# Patient Record
Sex: MALE | Race: WHITE | Employment: STUDENT | URBAN - METROPOLITAN AREA
[De-identification: names, ages, dates, MRNs, and addresses within clinical notes are randomized per-mention and may not be internally consistent; named-entity substitution may affect disease eponyms.]

---
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
## 2020-01-07 ENCOUNTER — HOSPITAL ENCOUNTER (OUTPATIENT)
Dept: GENERAL RADIOLOGY | Age: 20
Discharge: HOME | End: 2020-01-07
Attending: FAMILY MEDICINE
Payer: COMMERCIAL

## 2020-01-07 ENCOUNTER — OFFICE VISIT (OUTPATIENT)
Dept: FAMILY MEDICINE CLINIC | Facility: CLINIC | Age: 20
End: 2020-01-07
Payer: COMMERCIAL

## 2020-01-07 VITALS
HEIGHT: 67.5 IN | HEART RATE: 111 BPM | SYSTOLIC BLOOD PRESSURE: 134 MMHG | DIASTOLIC BLOOD PRESSURE: 83 MMHG | WEIGHT: 172.81 LBS | BODY MASS INDEX: 26.81 KG/M2 | TEMPERATURE: 98 F

## 2020-01-07 DIAGNOSIS — S60.212A CONTUSION OF LEFT WRIST, INITIAL ENCOUNTER: ICD-10-CM

## 2020-01-07 DIAGNOSIS — M25.532 ACUTE PAIN OF LEFT WRIST: Primary | ICD-10-CM

## 2020-01-07 DIAGNOSIS — M25.532 ACUTE PAIN OF LEFT WRIST: ICD-10-CM

## 2020-01-07 PROCEDURE — 99213 OFFICE O/P EST LOW 20 MIN: CPT | Performed by: FAMILY MEDICINE

## 2020-01-07 PROCEDURE — L3908 WHO COCK-UP NONMOLDE PRE OTS: HCPCS | Performed by: FAMILY MEDICINE

## 2020-01-07 PROCEDURE — 73110 X-RAY EXAM OF WRIST: CPT | Performed by: FAMILY MEDICINE

## 2020-01-07 PROCEDURE — 99212 OFFICE O/P EST SF 10 MIN: CPT | Performed by: FAMILY MEDICINE

## 2020-01-07 RX ORDER — NAPROXEN 500 MG/1
500 TABLET ORAL 2 TIMES DAILY WITH MEALS
Qty: 42 TABLET | Refills: 0 | Status: SHIPPED | OUTPATIENT
Start: 2020-01-07 | End: 2020-01-22

## 2020-01-22 RX ORDER — NAPROXEN 500 MG/1
TABLET ORAL
Qty: 42 TABLET | Refills: 0 | Status: SHIPPED | OUTPATIENT
Start: 2020-01-22 | End: 2020-02-16

## 2020-01-23 NOTE — TELEPHONE ENCOUNTER
Refill passed per [facility] protocol.   Refill Protocol Appointment Criteria  · Appointment scheduled in the past 6 months or in the next 3 months  Recent Outpatient Visits            2 weeks ago Acute pain of left wrist    [facility], [address]

## 2020-01-27 ENCOUNTER — APPOINTMENT (OUTPATIENT)
Dept: LAB | Age: 20
End: 2020-01-27
Attending: FAMILY MEDICINE
Payer: COMMERCIAL

## 2020-01-27 ENCOUNTER — HOSPITAL ENCOUNTER (OUTPATIENT)
Dept: GENERAL RADIOLOGY | Age: 20
Discharge: HOME | End: 2020-01-27
Attending: FAMILY MEDICINE
Payer: COMMERCIAL

## 2020-01-27 ENCOUNTER — OFFICE VISIT (OUTPATIENT)
Dept: FAMILY MEDICINE CLINIC | Facility: CLINIC | Age: 20
End: 2020-01-27
Payer: COMMERCIAL

## 2020-01-27 VITALS
TEMPERATURE: 97 F | BODY MASS INDEX: 26.84 KG/M2 | SYSTOLIC BLOOD PRESSURE: 131 MMHG | HEIGHT: 67.5 IN | DIASTOLIC BLOOD PRESSURE: 88 MMHG | HEART RATE: 108 BPM | WEIGHT: 173 LBS

## 2020-01-27 DIAGNOSIS — M77.8 TENDINITIS OF LEFT WRIST: ICD-10-CM

## 2020-01-27 DIAGNOSIS — M25.532 LEFT WRIST PAIN: ICD-10-CM

## 2020-01-27 DIAGNOSIS — R00.0 TACHYCARDIA: ICD-10-CM

## 2020-01-27 DIAGNOSIS — M25.532 LEFT WRIST PAIN: Primary | ICD-10-CM

## 2020-01-27 LAB — TSI SER-ACNC: 1.7 MIU/ML (ref 0.36–3.74)

## 2020-01-27 PROCEDURE — 84443 ASSAY THYROID STIM HORMONE: CPT

## 2020-01-27 PROCEDURE — 99212 OFFICE O/P EST SF 10 MIN: CPT | Performed by: FAMILY MEDICINE

## 2020-01-27 PROCEDURE — 93010 ELECTROCARDIOGRAM REPORT: CPT | Performed by: FAMILY MEDICINE

## 2020-01-27 PROCEDURE — 93005 ELECTROCARDIOGRAM TRACING: CPT

## 2020-01-27 PROCEDURE — 99214 OFFICE O/P EST MOD 30 MIN: CPT | Performed by: FAMILY MEDICINE

## 2020-01-27 PROCEDURE — 36415 COLL VENOUS BLD VENIPUNCTURE: CPT

## 2020-01-27 PROCEDURE — 73110 X-RAY EXAM OF WRIST: CPT | Performed by: FAMILY MEDICINE

## 2020-01-27 NOTE — PROGRESS NOTES
Patient ID: Rachel Ashraf is a 23year old male. HPI  Patient presents with: Follow - Up: from 1/7/20    Last seen by me on 1/7/20 with left wrist pain following a fall. I reviewed the results of his left wrist XR from 1/7/20 with the pt.  His XR 0.10)*    * Growth percentiles are based on [facility] (Boys, 2-20 Years) data.     BP Readings from Last 6 Encounters:  01/27/20 : 131/88  01/07/20 : 134/83  04/05/19 : 103/65  08/29/18 : 113/65  08/27/18 : 118/72  06/23/18 : 118/75 (48 %, Z = -0.05 /  72 %, Z = Full ROM. No pain with passive flexion or extension. No tenderness over the scaphoid bone. He has equal range of motion compared to the right unaffected wrist.  There is no swelling or abnormality that is obvious. Neurovascularly intact.     Vitals revie

## 2020-01-28 ENCOUNTER — PATIENT MESSAGE (OUTPATIENT)
Dept: FAMILY MEDICINE CLINIC | Facility: CLINIC | Age: 20
End: 2020-01-28

## 2020-01-28 NOTE — TELEPHONE ENCOUNTER
From: Eric Mcneill  To: Silvia Banegas DO  Sent: 1/28/2020 11:05 AM CST  Subject: Test Results Question    The doctor is ordering a test based on the results from the EKG. How do I get the information on scheduling this?

## 2020-02-05 ENCOUNTER — HOSPITAL ENCOUNTER (OUTPATIENT)
Dept: CV DIAGNOSTICS | Facility: HOSPITAL | Age: 20
Discharge: HOME | End: 2020-02-05
Attending: FAMILY MEDICINE
Payer: COMMERCIAL

## 2020-02-05 DIAGNOSIS — R00.0 TACHYCARDIA: ICD-10-CM

## 2020-02-05 PROCEDURE — 93225 XTRNL ECG REC<48 HRS REC: CPT | Performed by: FAMILY MEDICINE

## 2020-02-05 PROCEDURE — 93227 XTRNL ECG REC<48 HR R&I: CPT | Performed by: FAMILY MEDICINE

## 2020-02-17 RX ORDER — NAPROXEN 500 MG/1
TABLET ORAL
Qty: 42 TABLET | Refills: 0 | Status: SHIPPED | OUTPATIENT
Start: 2020-02-17

## 2020-11-13 NOTE — PROGRESS NOTES
Patient ID: Simone Kamara is a 23year old male. HPI  Patient presents with:  Wrist Pain: left wrist pain x 5 days    Pt fell while skateboarding on a quarter pipe ramp when his wheels hit a crack. He landed on his outstretched left palm.  He ulnar de Negative for chest tightness and shortness of breath. Cardiovascular: Negative for chest pain. Gastrointestinal: Negative for abdominal pain. Musculoskeletal: Positive for arthralgias (left wrist pain) and joint swelling (left wrist).    Skin: Cullen Resendiz [Dear  ___] : Dear  [unfilled], [Courtesy Letter:] : I had the pleasure of seeing your patient, [unfilled], in my office today. encounter  -     XR WRIST COMPLETE (MIN 3 VIEWS), LEFT (CPT=73110);  Future        [place]   1/7/2020      By signing my name below, Hannah Gramajo,  attest that this documentation has been prepared under the direction and in the presence of Alfredo [Please see my note below.] : Please see my note below. [Sincerely,] : Sincerely, [FreeTextEntry2] : Giuseppe Brooks

## 2023-07-26 NOTE — TELEPHONE ENCOUNTER
Form was not completed and Dr May So is out of office today, last seen  [address] [facility] 2014 will need appt to have forms filled out. Contacted mother and informed her of above.  I apologized form not completed and advise patient will need to schedule an appt to
Last seen by JULIÁN.
Mother calling checking status of forms, please call when ready for  at [facility]. Pt is leaving for camp on 06/24 and will need prior. Please rush.
Mother calling requesting call form nurse for [facility]. Pt needs for today as he will be leaving for camp at 6 am on 06/24. Please rush.
Needs form completed for camp counselor ( has been seen in the last two years ) up to date on vaccines. Starts on Saturday.    Need completed asap  Will bring in if necessary
Per Dr. Jami Lynn he is not able to fill out forms. The last time he saw Dr. Jami Lynn was 2014. Last seen by Dr. Elizabeth Arriaga on 04/2016.
Pt was seen 6/23/17
Spoke to Patients mom in regards to forms, Mom very upset forms have not been completed, patient will be leaving to camp on Saturday morning and will need forms  Forms were placed in Drs Gearline Gaucher and have not been completed.  Offered patient an appointment but
Spoke with mom Santosh Johnson, fax # for ADO provided - [phone]. Form is not for SHAMAR, but for MD. Pt volunteered for handicapped camp, needs release that is up to date with immunizations, etc. Needs by Saturday.  NK
0

## 2024-04-16 PROBLEM — Z00.00 ENCOUNTER FOR WELLNESS EXAMINATION IN ADULT: Status: ACTIVE | Noted: 2024-04-16

## 2024-04-16 NOTE — PROGRESS NOTES
Subjective:   Priyank Louis is a 23 year old male who presents for No chief complaint on file.   Patient is a pleasant 23-year-old male with no significant past medical history.  Patient presents to office today for physical.  Patient states his health is alright. Reports some urge to have BM. Resolves after he has one. 2-3 years this has been going on. Reports poor diet and increased stress. Cramping type pain.     Diet: Terrible. Usually eats fast food at work and at home whatever is there.   Exercise: Mainly at work. Lots of mechanical stuff. Moves a lot in work. Educated on needs for body.  Sleep: Averages about 5 hours of sleep a night. Gets tired and lays down. When he lays down he wakes up and then plays on phone. Educated on better sleep jacqueline and pink noise.  Stress: Lots at work. Feels like he is only one who does anything. Would play games and or talk to friends to reduce stress.  Social: Engaged. Planning wedding. Lives with parent and fiance. No kids.   Sexually Active: Not for last month. Fiance recently had GB removed.   Prophylaxis: Condoms   Alcohol: Yes, binge on weekends, seldom during week.   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   Vaccinations: tetanus          Past Medical History:    Concussion    4/21/16    T/A hypertrophy      Past Surgical History:   Procedure Laterality Date    Remove tonsils/adenoids,<11 y/o      Tonsillectomy          History/Other:    No Further Nursing Notes to Review         Tobacco:  He has never smoked tobacco.    No current outpatient medications on file.         Review of Systems:  Review of Systems   Constitutional: Negative.  Negative for activity change, chills and fever.   HENT: Negative.  Negative for congestion, ear pain, postnasal drip, sinus pain, sore throat and trouble swallowing.    Respiratory: Negative.  Negative for cough, shortness of breath and wheezing.     Cardiovascular: Negative.  Negative for chest pain and leg swelling.   Gastrointestinal: Negative.  Negative for abdominal pain, blood in stool, constipation and diarrhea.   Endocrine: Negative.    Genitourinary: Negative.  Negative for difficulty urinating, dysuria and flank pain.   Musculoskeletal: Negative.  Negative for arthralgias, back pain and neck stiffness.   Skin: Negative.  Negative for color change and rash.   Neurological: Negative.  Negative for dizziness and headaches.   Hematological:  Negative for adenopathy.   Psychiatric/Behavioral:  Positive for dysphoric mood and sleep disturbance. Negative for suicidal ideas.          Objective:   There were no vitals taken for this visit. Estimated body mass index is 26.7 kg/m² as calculated from the following:    Height as of 1/27/20: 5' 7.5\" (1.715 m).    Weight as of 1/27/20: 173 lb (78.5 kg).  Physical Exam  Vitals and nursing note reviewed.   Constitutional:       Appearance: Normal appearance. He is normal weight.   HENT:      Head: Normocephalic.      Right Ear: Tympanic membrane, ear canal and external ear normal.      Left Ear: Tympanic membrane, ear canal and external ear normal.      Nose: Nose normal. No congestion or rhinorrhea.      Mouth/Throat:      Mouth: Mucous membranes are moist.   Eyes:      Extraocular Movements: Extraocular movements intact.      Pupils: Pupils are equal, round, and reactive to light.   Cardiovascular:      Rate and Rhythm: Normal rate and regular rhythm.      Pulses: Normal pulses.      Heart sounds: Normal heart sounds. No murmur heard.  Pulmonary:      Effort: Pulmonary effort is normal. No respiratory distress.      Breath sounds: Normal breath sounds. No wheezing.   Abdominal:      General: There is no distension.      Palpations: Abdomen is soft.      Tenderness: There is no abdominal tenderness.   Musculoskeletal:         General: Normal range of motion.      Cervical back: Normal range of motion and neck  supple.      Right lower leg: No edema.      Left lower leg: No edema.   Lymphadenopathy:      Cervical: No cervical adenopathy.   Skin:     General: Skin is warm and dry.      Capillary Refill: Capillary refill takes less than 2 seconds.      Findings: No rash.   Neurological:      General: No focal deficit present.      Mental Status: He is alert and oriented to person, place, and time.   Psychiatric:         Mood and Affect: Mood normal.         Behavior: Behavior normal.           Assessment & Plan:   1. Encounter for wellness examination in adult (Primary)        No follow-ups on file.    MINH Wyman, 4/16/2024, 1:51 PM

## 2024-04-18 ENCOUNTER — OFFICE VISIT (OUTPATIENT)
Dept: FAMILY MEDICINE CLINIC | Facility: CLINIC | Age: 24
End: 2024-04-18

## 2024-04-18 VITALS
WEIGHT: 176 LBS | DIASTOLIC BLOOD PRESSURE: 75 MMHG | HEIGHT: 69 IN | BODY MASS INDEX: 26.07 KG/M2 | SYSTOLIC BLOOD PRESSURE: 126 MMHG | HEART RATE: 65 BPM

## 2024-04-18 DIAGNOSIS — Z00.00 ENCOUNTER FOR WELLNESS EXAMINATION IN ADULT: Primary | ICD-10-CM

## 2024-04-18 DIAGNOSIS — K58.9 IRRITABLE BOWEL SYNDROME, UNSPECIFIED TYPE: ICD-10-CM

## 2024-04-18 DIAGNOSIS — Z23 NEED FOR TETANUS BOOSTER: ICD-10-CM

## 2024-04-18 DIAGNOSIS — R45.89 DEPRESSED MOOD: ICD-10-CM

## 2024-04-18 PROCEDURE — 90715 TDAP VACCINE 7 YRS/> IM: CPT

## 2024-04-18 PROCEDURE — 96127 BRIEF EMOTIONAL/BEHAV ASSMT: CPT

## 2024-04-18 PROCEDURE — 90471 IMMUNIZATION ADMIN: CPT

## 2024-04-18 PROCEDURE — 99395 PREV VISIT EST AGE 18-39: CPT

## 2024-04-18 NOTE — ASSESSMENT & PLAN NOTE
PHQ9 score 13  Depressed mood with stress at work  Comes and goes  Reports worse after November and winter, check Vitamin D  Was going to [employer] online   Referral made to  here for eval.  IF IBS persists with symptoms and depression, consider starting zoloft.

## 2024-04-18 NOTE — ASSESSMENT & PLAN NOTE
Poor diet  Increased stress  Pain/discomfort with sudden urge to have BM reolved after BM  Increase fiber and water     Consider adding sertraline.

## 2024-04-18 NOTE — ASSESSMENT & PLAN NOTE
Wellness labs ordered.  Encouraged increasing physical activity which includes raising heart rate 3 to 5 days/week for at least 30 minutes at a time, while also performing mild strength exercises.  Patient counseled on importance of dietary modifications, which may include limiting fats, red meat, and carbohydrates, while increasing fruit and vegetable intake, and trying to adhere to a low sodium/Mediterranean diet.  Encouraged to manage stress.  Encouraged good sleep habits.  Encouraged good sexual health.    Patient aware of plan of care. All questions answered to satisfaction of the patient. Patient instructed to call office or reach out via [employer] if any issues arise. For urgent issues and/or reviewed red flags please proceed to the urgent care or ER.  Also, inform the nurse practitioner with any new symptoms or medication side effects.

## 2024-04-29 ENCOUNTER — TELEPHONE (OUTPATIENT)
Dept: FAMILY MEDICINE CLINIC | Facility: CLINIC | Age: 24
End: 2024-04-29

## 2024-04-29 NOTE — TELEPHONE ENCOUNTER
Labs from 04/22/2024 have been faxed out to [facility] at [phone]. Message left for patient with above information

## 2024-04-29 NOTE — TELEPHONE ENCOUNTER
Patient would like orders for labs ordered on 4/22/24  to be faxed to [facility]. Fax number is # [phone]. Please advise

## 2024-05-14 LAB
ABSOLUTE BASOPHILS: 38 CELLS/UL (ref 0–200)
ABSOLUTE EOSINOPHILS: 141 CELLS/UL (ref 15–500)
ABSOLUTE LYMPHOCYTES: 1869 CELLS/UL (ref 850–3900)
ABSOLUTE MONOCYTES: 474 CELLS/UL (ref 200–950)
ABSOLUTE NEUTROPHILS: 3878 CELLS/UL (ref 1500–7800)
ALBUMIN/GLOBULIN RATIO: 1.6 (CALC) (ref 1–2.5)
ALBUMIN: 4.4 G/DL (ref 3.6–5.1)
ALKALINE PHOSPHATASE: 69 U/L (ref 36–130)
ALT: 7 U/L (ref 9–46)
AST: 14 U/L (ref 10–40)
BASOPHILS: 0.6 %
BILIRUBIN, TOTAL: 0.4 MG/DL (ref 0.2–1.2)
BUN: 15 MG/DL (ref 7–25)
CALCIUM: 9.7 MG/DL (ref 8.6–10.3)
CARBON DIOXIDE: 26 MMOL/L (ref 20–32)
CHLORIDE: 104 MMOL/L (ref 98–110)
CREATININE: 0.85 MG/DL (ref 0.6–1.24)
EGFR: 125 ML/MIN/1.73M2
EOSINOPHILS: 2.2 %
GLOBULIN: 2.8 G/DL (CALC) (ref 1.9–3.7)
GLUCOSE: 80 MG/DL (ref 65–99)
HEMATOCRIT: 42.5 % (ref 38.5–50)
HEMOGLOBIN A1C: 5.4 % OF TOTAL HGB
HEMOGLOBIN: 12.6 G/DL (ref 13.2–17.1)
LYMPHOCYTES: 29.2 %
MCH: 20.1 PG (ref 27–33)
MCHC: 29.6 G/DL (ref 32–36)
MCV: 67.8 FL (ref 80–100)
MONOCYTES: 7.4 %
MPV: 11.3 FL (ref 7.5–12.5)
NEUTROPHILS: 60.6 %
PLATELET COUNT: 298 THOUSAND/UL (ref 140–400)
POTASSIUM: 4.2 MMOL/L (ref 3.5–5.3)
PROTEIN, TOTAL: 7.2 G/DL (ref 6.1–8.1)
RDW: 18.1 % (ref 11–15)
RED BLOOD CELL COUNT: 6.27 MILLION/UL (ref 4.2–5.8)
SODIUM: 140 MMOL/L (ref 135–146)
TSH W/REFLEX TO FT4: 2.18 MIU/L (ref 0.4–4.5)
VITAMIN D, 25-OH, TOTAL: 23 NG/ML (ref 30–100)
WHITE BLOOD CELL COUNT: 6.4 THOUSAND/UL (ref 3.8–10.8)

## 2024-05-15 DIAGNOSIS — E55.9 VITAMIN D DEFICIENCY: ICD-10-CM

## 2024-05-15 DIAGNOSIS — R79.89 ABNORMAL CBC: Primary | ICD-10-CM

## 2024-05-15 RX ORDER — ERGOCALCIFEROL 1.25 MG/1
50000 CAPSULE ORAL WEEKLY
Qty: 12 CAPSULE | Refills: 0 | Status: SHIPPED | OUTPATIENT
Start: 2024-05-15 | End: 2024-08-01

## 2024-05-15 NOTE — PROGRESS NOTES
1. Abnormal CBC  Patient CBC mild anemia, microcytic and microchromic.  Mentzer index 10.81  Iron studies ordered and hgb electrophoresis to eval for thalassemia.  Refer to heme as needed.  Schedule follow up to discuss.  - IRON AND TIBC [7573] [Q]  - FERRITIN [457] [Q]  - Hemoglobin Electrophoresis w/Rflx Alpha,beta Chain; Future    2. Vitamin D deficiency  Vitamin D deficient on wellness labs.  Level 23.0  Start ergocalciferol weekly x 12 weeks.  Follow up one year.   - ergocalciferol 1.25 MG (01609 UT) Oral Cap; Take 1 capsule (50,000 Units total) by mouth once a week for 12 doses.  Dispense: 12 capsule; Refill: 0    MINH Wyman

## 2024-05-16 ENCOUNTER — TELEMEDICINE (OUTPATIENT)
Dept: FAMILY MEDICINE CLINIC | Facility: CLINIC | Age: 24
End: 2024-05-16

## 2024-05-16 DIAGNOSIS — R79.89 ABNORMAL CBC: ICD-10-CM

## 2024-05-16 DIAGNOSIS — E55.9 VITAMIN D DEFICIENCY: Primary | ICD-10-CM

## 2024-05-16 PROCEDURE — 99213 OFFICE O/P EST LOW 20 MIN: CPT

## 2024-05-16 NOTE — ASSESSMENT & PLAN NOTE
Patient CBC mild anemia, microcytic and microchromic.  Mentzer index 10.81  Iron studies ordered and hgb electrophoresis to eval for thalassemia.  Refer to heme as needed.

## 2024-05-16 NOTE — PROGRESS NOTES
Subjective:   Priyank Louis is a 23 year old male who presents for No chief complaint on file.   Patient is a pleasant 23-year-old male with no significant past medical history. Patient presents to office following physical to review labs.  Patient found to be vitamin D deficient.  Patient also found to be hypochromic microcytic and anemic.  Mentzer index 10.81.  Instructed patient to follow-up in office to discuss results.  Patient states in office today        Past Medical History:    Concussion    4/21/16    T/A hypertrophy      Past Surgical History:   Procedure Laterality Date    Remove tonsils/adenoids,<11 y/o      Tonsillectomy          History/Other:    No Further Nursing Notes to Review         Tobacco:  He has never smoked tobacco.    Current Outpatient Medications   Medication Sig Dispense Refill    ergocalciferol 1.25 MG (66418 UT) Oral Cap Take 1 capsule (50,000 Units total) by mouth once a week for 12 doses. 12 capsule 0         Review of Systems:  Review of Systems      Objective:   There were no vitals taken for this visit. Estimated body mass index is 25.99 kg/m² as calculated from the following:    Height as of 4/18/24: 5' 9\" (1.753 m).    Weight as of 4/18/24: 176 lb (79.8 kg).  Physical Exam      Assessment & Plan:   1. Vitamin D deficiency (Primary)  2. Abnormal CBC      {Tip  Follow Up:8907}  No follow-ups on file.    MINH Wyman, 5/16/2024, 7:46 AM

## 2024-05-16 NOTE — PROGRESS NOTES
This is a telemedicine visit with live, interactive video and audio.     Patient understands and accepts financial responsibility for any deductible, co-insurance and/or co-pays associated with this service.    Patient is a pleasant 23-year-old male with no significant past medical history. Patient presents to office following physical to review labs.  Patient found to be vitamin D deficient.  Patient also found to be hypochromic microcytic and anemic.  Mentzer index 10.81.  Instructed patient to follow-up in office to discuss results.  Patient states via video visit today he is aware of thalassemia trait in family. Has never had formal testing. Reviewed labs with patient. All quesitons answered to satisfaction of patient. Has picked up vitamin D will start on Fri. Will obtain new labwork.            SUBJECTIVE  Review of Systems   Constitutional: Negative.  Negative for activity change, chills and fever.   HENT: Negative.  Negative for congestion, ear pain, postnasal drip, sinus pain, sore throat and trouble swallowing.    Respiratory: Negative.  Negative for cough, shortness of breath and wheezing.    Cardiovascular: Negative.  Negative for chest pain and leg swelling.   Gastrointestinal: Negative.  Negative for abdominal pain, blood in stool, constipation and diarrhea.   Endocrine: Negative.    Genitourinary: Negative.  Negative for difficulty urinating, dysuria and flank pain.   Musculoskeletal: Negative.  Negative for arthralgias, back pain and neck stiffness.   Skin: Negative.  Negative for color change and rash.   Neurological: Negative.  Negative for dizziness and headaches.   Hematological:  Negative for adenopathy.   All other systems reviewed and are negative.       HISTORY:  Past Medical History:    Concussion    4/21/16    T/A hypertrophy      Past Surgical History:   Procedure Laterality Date    Remove tonsils/adenoids,<13 y/o      Tonsillectomy        Family History   Problem Relation Age of Onset     Heart Disorder Maternal Grandfather         Mitral valve repair.     Other (Bladder too small) Sister     Diabetes Maternal Grandmother     Diabetes Other       Social History     Socioeconomic History    Marital status: Single   Tobacco Use    Smoking status: Never    Smokeless tobacco: Never   Vaping Use    Vaping status: Never Used   Substance and Sexual Activity    Alcohol use: No    Drug use: No   Other Topics Concern    Caffeine Concern No        No Known Allergies   Current Outpatient Medications   Medication Sig Dispense Refill    ergocalciferol 1.25 MG (81403 UT) Oral Cap Take 1 capsule (50,000 Units total) by mouth once a week for 12 doses. 12 capsule 0       OBJECTIVE  Physical Exam:   alert, appears stated age, and cooperative and no complaints    ASSESSMENT & PLAN  Problem List Items Addressed This Visit          Unprioritized    Abnormal CBC     Patient CBC mild anemia, microcytic and microchromic.  Mentzer index 10.81  Iron studies ordered and hgb electrophoresis to eval for thalassemia.  Refer to heme as needed.         Vitamin D deficiency - Primary     Vitamin D deficient on wellness labs.  Level 23.0  Start ergocalciferol weekly x 12 weeks.  Follow up one year.                MINH Wyman

## 2024-05-16 NOTE — ASSESSMENT & PLAN NOTE
Vitamin D deficient on wellness labs.  Level 23.0  Start ergocalciferol weekly x 12 weeks.  Follow up one year.

## (undated) NOTE — LETTER
8/29/2018      To Whom It May Concern:    Rachel Ashraf is currently under my medical care and may return to school at this time. Please excuse Gabriel Emery for 4 weeks for sports. He may return to school on 08/30/18.   Activity is restricted as follows: no

## (undated) NOTE — Clinical Note
[facility] of [address] [place] [address] Michael Grace of Child Health Examination       Student's Name  Lisa Mems Birth Da Title                           Date    (If adding dates to the above immunization history section, put your initials by date(s) and sign here.)   ALTERNATIVE PROOF OF IMMUNITY   1.Clinical diagnosis (measles, mumps, hepatits B) is allowed when verified b Loss of function of one of paired organs? (eye/ear/kidney/testicle)   Yes   No      Birth Defects? Developmental delay? Yes   No    Yes   No  Hospitalizations? When? What for? Yes   No    Blood disorders? Hemophilia, Sickle Cell, Other? Explain. ovarian syndrome, acanthosis nigricans)                           At Risk  NO   Lead Risk Questionnaire  Req'd for children 6 months thru 6 yrs enrolled in licensed or public school operated day care, ,  nursery school and/or  (blood t SPECIAL INSTRUCTIONS/DEVICES e.g. safety glasses, glass eye, chest protector for arrhythmia, pacemaker, prosthetic device, dental bridge, false teeth, athleticsupport/cup     None   MENTAL HEALTH/OTHER   Is there anything else the school should know about

## (undated) NOTE — Clinical Note
VACCINE ADMINISTRATION RECORD  PARENT / GUARDIAN APPROVAL  Date: 2017  Vaccine administered to:  Nolene Phalen     : 10/6/2000    MRN: [id number]    A copy of the appropriate Centers for Disease Control and Prevention Vaccine Information statement

## (undated) NOTE — LETTER
State of [facility] Examination       Student's Name  Rob Favorite Birth Da Signature                                                                                                                                              Title                           Date    (If adding dates to the above immunization history section, put y ALLERGIES  (Food, drug, insect, other) MEDICATION  (List all prescribed or taken on a regular basis.)     Diagnosis of asthma?   Child wakes during the night coughing   Yes   No    Yes   No    Loss of function of one of paired organs? (eye/ear/kidney/testic DIABETES SCREENING  BMI>85% age/sex  No And any two of the following:  Family History No   Ethnic Minority  No          Signs of Insulin Resistance (hypertension, dyslipidemia, polycystic ovarian syndrome, acanthosis nigricans)    No           At Risk  No Quick-relief  medication (e.g. Short Acting Beta Antagonist): No          Controller medication (e.g. inhaled corticosteroid):   No Other   NEEDS/MODIFICATIONS required in the school setting  None DIETARY Needs/Restrictions     None   SPECIAL INSTR

## (undated) NOTE — MR AVS SNAPSHOT
[address], [address]  [address] [facility]  [phone]               Thank you for choosing us for your health care visit with Mariama Marcos DO.   We are glad to serve you and happy to provide you with this summary For medical emergencies, dial 911.             Educational Information     Healthy Active Living  An initiative of the American Academy of Pediatrics    Fact Sheet: Healthy Active Living for Families    Healthy nutrition starts as early as infancy with frank Healthy active children are more likely to be healthy active adults!              Visit [facility] online at  [email or web address]